# Patient Record
(demographics unavailable — no encounter records)

---

## 2017-06-09 RX ORDER — GABAPENTIN 300 MG/1
CAPSULE ORAL
Qty: 90 CAPSULE | Refills: 3 | Status: SHIPPED | OUTPATIENT
Start: 2017-06-09

## 2017-06-09 NOTE — TELEPHONE ENCOUNTER
Pending Prescriptions:                       Disp   Refills    gabapentin (NEURONTIN) 300 MG capsule     90 cap*3            Sig: Take two to three tablets at bedtime    Last Written Prescription Date:  10/24/2016  Last Fill Quantity: 90,   # refills: 3  Last Office Visit with [facility], [facility] or [facility] prescribing provider: 12/04/15  Future Office visit:   No follow up scheduled at this time.    TESS Smith